# Patient Record
Sex: MALE | Race: WHITE
[De-identification: names, ages, dates, MRNs, and addresses within clinical notes are randomized per-mention and may not be internally consistent; named-entity substitution may affect disease eponyms.]

---

## 2019-08-07 ENCOUNTER — HOSPITAL ENCOUNTER (EMERGENCY)
Dept: HOSPITAL 76 - ED | Age: 24
Discharge: HOME | End: 2019-08-07
Payer: COMMERCIAL

## 2019-08-07 VITALS — SYSTOLIC BLOOD PRESSURE: 117 MMHG | DIASTOLIC BLOOD PRESSURE: 76 MMHG

## 2019-08-07 DIAGNOSIS — S52.614A: ICD-10-CM
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
## 2019-08-07 DIAGNOSIS — S52.571A: Primary | ICD-10-CM
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
## 2019-08-07 PROCEDURE — 29125 APPL SHORT ARM SPLINT STATIC: CPT

## 2019-08-07 PROCEDURE — 73110 X-RAY EXAM OF WRIST: CPT

## 2019-08-07 PROCEDURE — 99284 EMERGENCY DEPT VISIT MOD MDM: CPT

## 2019-08-07 PROCEDURE — 99283 EMERGENCY DEPT VISIT LOW MDM: CPT

## 2019-08-07 NOTE — XRAY REPORT
Reason:  Sports injury, pain

Procedure Date:  08/07/2019   

Accession Number:  [id number] / [id number]                    

Procedure:  XR  - Wrist 4 View RT CPT Code:  

 

FULL RESULT:

 

 

EXAM:

RIGHT WRIST RADIOGRAPHY

 

EXAM DATE: 8/7/2019 08:58 PM.

 

CLINICAL HISTORY: Sports injury, pain.

 

COMPARISON: None.

 

TECHNIQUE: 3 views.

 

FINDINGS:

Bones: There is a nondisplaced intra-articular fracture of the distal 

radius. There is a small chip fracture from the ulnar styloid.

 

Joints: Normal. No subluxations.

 

Soft Tissues: Minimal surrounding soft tissue swelling.

IMPRESSION: Intra-articular, nondisplaced sagittal fracture line in the 

distal radius into the radiocarpal joint.

 

Tiny chip fracture from the styloid.

 

[facility]

## 2019-08-07 NOTE — ED PHYSICIAN DOCUMENTATION
PD HPI UPPER EXT INJURY





- Stated complaint


Stated Complaint: RIGHT WRIST INJURY





- Chief complaint


Chief Complaint: Ext Problem





- History obtained from


History obtained from: Patient





- History of Present Illness


Location: Right, Wrist


Type of injury: Fall


Where injury occurred: [place]


Timing - duration: Hours (2)


Timing - details: Abrupt onset


Pain level max: 6


Pain level now: 5


Improved by: Rest


Worsened by: Moving, Palpating


Associated symptoms: Swelling.  No: Weakness, Numbness, Tingling


Contributing factors: No: Anticoagulated


Recently seen: No: Not recently seen





- Additonal information


Additional information: 





Patient is left-handed.  He was sliding into base with his right hand and felt a

pop in his wrist.





Review of Systems


Constitutional: denies: Fever, Chills


: denies: Dysuria


Skin: denies: Rash


Musculoskeletal: denies: Neck pain, Back pain


Neurologic: denies: Headache





PD PAST MEDICAL HISTORY





- Past Medical History


Past Medical History: No





- Past Surgical History


Past Surgical History: No





- Present Medications


Home Medications: 


                                Ambulatory Orders











 Medication  Instructions  Recorded  Confirmed


 


Hydrocodone/Acetaminophen 1 - 2 each PO Q6H PRN #14 tablet 08/07/19 





[Hydrocodon-Acetaminophen 5-325]   


 


Ibuprofen [Motrin] 800 mg PO Q8H PRN #30 tablet 08/07/19 














- Allergies


Allergies/Adverse Reactions: 


                                    Allergies











Allergy/AdvReac Type Severity Reaction Status Date / Time


 


No Known Drug Allergies Allergy   Verified 08/07/19 20:41














- Living Situation


Living Arrangement: reports: At home





- Social History


Does the pt have substance abuse?: No





- Family History


Family history: reports: Non contributory





PD ED PE NORMAL





- Vitals


Vital signs reviewed: Yes





- General


General: Alert and oriented X 3, No acute distress, Well developed/nourished





- HEENT


HEENT: Moist mucous membranes





- Neck


Neck: Supple, no meningeal sign





- Derm


Derm: Warm and dry





- Extremities


Extremities: Other (Right wrist mild swelling of the dorsum of the right wrist. 

 Tender palpation over the distal radius.  Neurovascular intact.)





- Neuro


Neuro: Alert and oriented X 3





- Psych


Psych: Normal mood, Normal affect





Results





- Vitals


Vitals: 


                                     Oxygen











O2 Source                      Room air

















- Rads (name of study)


  ** Right wrist x-ray


Radiology: Prelim report reviewed, EMP read contemporaneously, See rad report 

(Intra-articular, nondisplaced sagittal fracture line in the distal radius into 

the radiocarpal joint. Tiny chip fracture from the styloid. )





Procedures





- Splint (location)


  ** R wrist


Splint applied by: Physician, Tech


Type of splint: Fiberglass, Short arm, Sugar tong


Other: Patient tolerated well, No complications, Neurovascular intact, Sling 

provided





PD MEDICAL DECISION MAKING





- ED course


Complexity details: reviewed results, re-evaluated patient, considered 

differential, d/w patient


ED course: 





Right distal radius fracture.  Placed in a volar and dorsal splint.  

Neurovascularly intact.  We will follow-up with orthopedics.  Patient counseled 

regarding signs and symptoms for which I believe and urgent re-evaluation would 

be necessary. Patient with good understanding of and agreement to plan and is 

comfortable going home at this time





This document was made in part using voice recognition software. While efforts 

are made to proofread this document, sound alike and grammatical errors may 

occur.





Departure





- Departure


Disposition: 01 Home, Self Care


Clinical Impression: 


Fracture of right distal radius


Qualifiers:


 Encounter type: initial encounter Fracture type: closed Fracture morphology: 

unspecified fracture morphology Qualified Code(s): S52.501A - Unspecified 

fracture of the lower end of right radius, initial encounter for closed fracture





Condition: Good


Instructions:  ED Fx Upper Ext


Follow-Up: 


[facility] [place] [Provider Group] - Within 1 week


Prescriptions: 


Hydrocodone/Acetaminophen [Hydrocodon-Acetaminophen 5-325] 1 - 2 each PO Q6H PRN

#14 tablet


 PRN Reason: pain


Ibuprofen [Motrin] 800 mg PO Q8H PRN #30 tablet


 PRN Reason: PAIN &/OR FEVER


Comments: 


Wear the splint until released by orthopedics.  Follow-up with orthopedics 

within 1 week for further evaluation.  Wear the sling for comfort.





Do not drink alcohol or drive while on narcotic pain medicine. 


Note that many narcotic pain relievers also contain tylenol/acetaminophen. 

Please ensure that your total dose of acetaminophen from all sources does not 

exceed 3 grams (3000mg) per day. 


You may constipated on this medication, take a stool softener such as "Colace" 

twice a day while you are on it. Also recommend a over-the-counter laxative such

 as senna or MiraLAX any day that you do not have a bowel movement. 


If you received narcotic pain medication in the emergency department, do not 

drive or operate machinery for the next 24 hours.





Discharge Date/Time: 08/07/19 22:31